# Patient Record
Sex: MALE | Race: OTHER | HISPANIC OR LATINO | URBAN - METROPOLITAN AREA
[De-identification: names, ages, dates, MRNs, and addresses within clinical notes are randomized per-mention and may not be internally consistent; named-entity substitution may affect disease eponyms.]

---

## 2018-05-05 ENCOUNTER — EMERGENCY (EMERGENCY)
Facility: HOSPITAL | Age: 28
LOS: 1 days | Discharge: HOME | End: 2018-05-05
Attending: EMERGENCY MEDICINE | Admitting: EMERGENCY MEDICINE
Payer: SELF-PAY

## 2018-05-05 VITALS
DIASTOLIC BLOOD PRESSURE: 72 MMHG | SYSTOLIC BLOOD PRESSURE: 107 MMHG | OXYGEN SATURATION: 100 % | TEMPERATURE: 98 F | HEART RATE: 56 BPM | RESPIRATION RATE: 16 BRPM

## 2018-05-05 PROCEDURE — 99053 MED SERV 10PM-8AM 24 HR FAC: CPT

## 2018-05-05 PROCEDURE — 99283 EMERGENCY DEPT VISIT LOW MDM: CPT | Mod: 25

## 2018-05-05 RX ORDER — ACYCLOVIR SODIUM 500 MG
800 VIAL (EA) INTRAVENOUS ONCE
Qty: 0 | Refills: 0 | Status: COMPLETED | OUTPATIENT
Start: 2018-05-05 | End: 2018-05-05

## 2018-05-05 RX ORDER — ACYCLOVIR SODIUM 500 MG
1 VIAL (EA) INTRAVENOUS
Qty: 20 | Refills: 0 | OUTPATIENT
Start: 2018-05-05 | End: 2018-05-09

## 2018-05-05 RX ORDER — CALAMINE AND ZINC OXIDE AND PHENOL 160; 10 MG/ML; MG/ML
1 LOTION TOPICAL ONCE
Qty: 0 | Refills: 0 | Status: COMPLETED | OUTPATIENT
Start: 2018-05-05 | End: 2018-05-05

## 2018-05-05 RX ADMIN — Medication 800 MILLIGRAM(S): at 06:11

## 2018-05-05 RX ADMIN — CALAMINE AND ZINC OXIDE AND PHENOL 1 APPLICATION(S): 160; 10 LOTION TOPICAL at 06:28

## 2018-05-05 NOTE — ED ADULT NURSE REASSESSMENT NOTE - NS ED NURSE REASSESS COMMENT FT1
All assessment/evaluations performed by MD Cho & Sera.  Pt medicated as ordered.  In no distress at this time, awaiting dc instructions. All assessment/evaluations performed by MD Cho & Sera. Pt diagnosed w/ Chicken Pox, disseminated. Pt medicated as ordered.  In no distress at this time, awaiting dc instructions.

## 2018-05-05 NOTE — ED PROVIDER NOTE - ATTENDING CONTRIBUTION TO CARE
Sera OTERO- 27 Y/O M p/w diffuse itchy rash over the trunk and face and extremities sparing hands and soles for 2 days, symmetrical, had sub fever yesterday but no cough, diarrhea, headache, neck pain, photophobia,. No known sick contacts. Never had chickenpox as child. Pt has small baby at home, works in restaurant    pt is alert, well appearing male, non toxic, s1s2 normal reg, b/l clear breath sounds, peerl eomi, no lesions on cornea, face and trunk has vesicular lesions in different stages of healing, pruritic rash, no redness otherwise, abd soft, nt, nd, normal bowel sounds, neuro exam aox3, cn 2-12 intact, neck soft supple    plan to treat for chickenpox

## 2018-05-05 NOTE — ED ADULT TRIAGE NOTE - CHIEF COMPLAINT QUOTE
PT C/O hives and itching to entire body x 2 days. Pt states he was seen in [facility] yesterday for a head ache: he was given an injection of measles vaccine. Pt has hives, redness to face and torso and extremities at present time. Pt denies SOB, dyspnea, swelling of lips, tongue or throat, contact with any new soaps or chemicals, PMH, daily medications. Pt appears comfortable in triage. Pt placed on Airborne precautions pending eval. Charge RN notified.

## 2018-05-05 NOTE — ED PROVIDER NOTE - OBJECTIVE STATEMENT
27 y/o M with no PMH p/w rash generalized over his body. Pt's rash is itchy, reddened, began on face, spread to the rest of the face, associated with subjective fever. Pt seen at [place] yesterday, given varicella vaccine. Pt has never had varicella, never had any immunizations. Pt denies recent travel, sore throat, cough, SOB, back pain, chest pain, sick contacts.

## 2023-01-18 ENCOUNTER — EMERGENCY (EMERGENCY)
Facility: HOSPITAL | Age: 33
LOS: 1 days | Discharge: HOME | End: 2023-01-18
Admitting: STUDENT IN AN ORGANIZED HEALTH CARE EDUCATION/TRAINING PROGRAM
Payer: MEDICAID

## 2023-01-18 VITALS
RESPIRATION RATE: 15 BRPM | OXYGEN SATURATION: 100 % | DIASTOLIC BLOOD PRESSURE: 80 MMHG | SYSTOLIC BLOOD PRESSURE: 142 MMHG | HEART RATE: 75 BPM | TEMPERATURE: 98 F

## 2023-01-18 PROCEDURE — 99284 EMERGENCY DEPT VISIT MOD MDM: CPT

## 2023-01-18 RX ORDER — FLUCONAZOLE 150 MG/1
150 TABLET ORAL ONCE
Refills: 0 | Status: COMPLETED | OUTPATIENT
Start: 2023-01-18 | End: 2023-01-18

## 2023-01-18 RX ADMIN — FLUCONAZOLE 150 MILLIGRAM(S): 150 TABLET ORAL at 01:03

## 2023-01-18 NOTE — ED ADULT TRIAGE NOTE - CHIEF COMPLAINT QUOTE
as per Pt had mal odorous penile discharged rash to tip of penis and pain while retracting foreskin. No complaints of chest pain, headache, nausea, dizziness, vomiting  SOB, fever, chills verbalized. no Pmhx

## 2023-01-18 NOTE — ED PROVIDER NOTE - CLINICAL SUMMARY MEDICAL DECISION MAKING FREE TEXT BOX
32-year-old uncircumcised male no reported past medical history presents with redness, itchiness, and white discharge under foreskin of penis x3 weeks. \  Imp: Balanitis   plan  - Fluconazole 150 mg x 1 dose   - educated on proper hygiene, cleaning and drying of foreskin

## 2023-01-18 NOTE — ED PROVIDER NOTE - OBJECTIVE STATEMENT
32-year-old uncircumcised male no reported past medical history presents with redness, itchiness, and white discharge under foreskin of penis x3 weeks.  Patient states he has had the symptoms before usually after sexual relations, states usually self resolves.  Patient denies any discharge from penis.  Patient denies any testicular pain.  No STD history.  Patient only sexually active with wife, last sexual intercourse 4 days ago.

## 2023-01-18 NOTE — ED PROVIDER NOTE - PATIENT PORTAL LINK FT
You can access the FollowMyHealth Patient Portal offered by [facility] by registering at the following website: [email or web address]. By joining [facility]’s FollowMyHealth portal, you will also be able to view your health information using other applications (apps) compatible with our system.

## 2023-01-18 NOTE — ED ADULT NURSE NOTE - OBJECTIVE STATEMENT
Pt received to intake 10C. Pt A&Ox4, ambulatory at baseline. Pt c/o penile discharge and rash to tip of penis. Denies any fevers, chills, n/v/d. RR even and unlabored, pt in NAD. Medicated as per MAR. Safety measures in place

## 2023-01-18 NOTE — ED PROVIDER NOTE - GENITOURINARY, MLM
erythema to glans of penis with non odorous white thick discharge around under foreskin around glans of penis. No testicular tenderness. erythema to glans of penis without ulcerations or lesions, non odorous white thick discharge under foreskin around glans of penis. No testicular tenderness. No discharge from urethra.